# Patient Record
Sex: MALE | Race: WHITE | NOT HISPANIC OR LATINO | Employment: OTHER | URBAN - METROPOLITAN AREA
[De-identification: names, ages, dates, MRNs, and addresses within clinical notes are randomized per-mention and may not be internally consistent; named-entity substitution may affect disease eponyms.]

---

## 2023-06-07 ENCOUNTER — TELEPHONE (OUTPATIENT)
Dept: GASTROENTEROLOGY | Facility: CLINIC | Age: 78
End: 2023-06-07
Payer: MEDICARE

## 2023-06-07 NOTE — TELEPHONE ENCOUNTER
----- Message from Karen Young LPN sent at 6/2/2023  9:12 AM CDT -----  Needs to be scheduled for routine Colonoscopy  ----- Message -----  From: Adali Gdofrey  Sent: 6/2/2023   8:59 AM CDT  To: [facility] Staff    Type:  Needs Medical Advice    Who Called: sodium,  Symptoms (please be specific): -   How long has patient had these symptoms:  -  Pharmacy name and phone #:  -  Would the patient rather a call back or a response via [facility]?   Best Call Back Number: [phone]    Additional Information: pt received a letter, says it time to schedule recheck, please call